# Patient Record
Sex: MALE | Race: WHITE | HISPANIC OR LATINO
[De-identification: names, ages, dates, MRNs, and addresses within clinical notes are randomized per-mention and may not be internally consistent; named-entity substitution may affect disease eponyms.]

---

## 2020-12-21 ENCOUNTER — APPOINTMENT (OUTPATIENT)
Dept: OPHTHALMOLOGY | Facility: CLINIC | Age: 50
End: 2020-12-21
Payer: COMMERCIAL

## 2020-12-21 ENCOUNTER — NON-APPOINTMENT (OUTPATIENT)
Age: 50
End: 2020-12-21

## 2020-12-21 PROCEDURE — 99201 OFFICE OUTPATIENT NEW 10 MINUTES: CPT | Mod: 95

## 2023-06-09 ENCOUNTER — APPOINTMENT (OUTPATIENT)
Dept: ORTHOPEDIC SURGERY | Facility: CLINIC | Age: 53
End: 2023-06-09
Payer: COMMERCIAL

## 2023-06-09 VITALS — BODY MASS INDEX: 26.31 KG/M2 | WEIGHT: 205 LBS | HEIGHT: 74 IN

## 2023-06-09 DIAGNOSIS — I10 ESSENTIAL (PRIMARY) HYPERTENSION: ICD-10-CM

## 2023-06-09 PROCEDURE — 99203 OFFICE O/P NEW LOW 30 MIN: CPT | Mod: 25

## 2023-06-09 PROCEDURE — 73110 X-RAY EXAM OF WRIST: CPT | Mod: 50

## 2023-06-09 NOTE — PHYSICAL EXAM
[1st] : 1st [CMC Joint] : CMC joint [Bilateral] : wrists bilaterally [Degenerative change] : Degenerative change [] : no erythema [FreeTextEntry3] : B/L generalized OA findings  [FreeTextEntry8] : stage 3 first CMC arthritis

## 2023-06-09 NOTE — DISCUSSION/SUMMARY
[de-identified] : Discussed the nature of the diagnosis and risk and benefits of different modalities of treatment.\par Xrays reviewed. \par Discussed conservative management vs CSI. \par Pt would like a CSI in b/l [facility] [place] [facility]. \par Done today and tolerated well.\par All questions answered.\par

## 2023-06-09 NOTE — PROCEDURE
[Medium Joint Injection] : medium joint injection [Bilateral] : bilaterally of the [CMC Joint] : CMC joint [Pain] : pain [Inflammation] : inflammation [Alcohol] : alcohol [Ethyl Chloride sprayed topically] : ethyl chloride sprayed topically [Sterile technique used] : sterile technique used [___ cc    1%] : Lidocaine ~Vcc of 1%  [___ cc    10mg] : Triamcinolone (Kenalog) ~Vcc of 10 mg  [] : Patient tolerated procedure well [Risks, benefits, alternatives discussed / Verbal consent obtained] : the risks benefits, and alternatives have been discussed, and verbal consent was obtained

## 2023-06-09 NOTE — HISTORY OF PRESENT ILLNESS
[9] : 9 [Intermittent] : intermittent [Full time] : Work status: full time [de-identified] : 53 year old male presenting with b//l hand pain for the past 2 years, radial sided at the base of the thumbs. No injury/trauma. He has received injections in both thumbs 1 year ago by an outside provider with minimal relief. \par  [] : no [FreeTextEntry1] : bilateral hands  [FreeTextEntry5] : 54yo male presenting with bilateral hand pain. Has been experiencing pain for almost one year. Has seen another ortho, given injection with relief. Has taken Advil with relief.  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

## 2023-07-21 ENCOUNTER — APPOINTMENT (OUTPATIENT)
Dept: ORTHOPEDIC SURGERY | Facility: CLINIC | Age: 53
End: 2023-07-21

## 2023-10-13 ENCOUNTER — APPOINTMENT (OUTPATIENT)
Dept: ORTHOPEDIC SURGERY | Facility: CLINIC | Age: 53
End: 2023-10-13
Payer: COMMERCIAL

## 2023-10-13 VITALS — BODY MASS INDEX: 26.31 KG/M2 | HEIGHT: 74 IN | WEIGHT: 205 LBS

## 2023-10-13 PROCEDURE — 99213 OFFICE O/P EST LOW 20 MIN: CPT | Mod: 25

## 2023-10-13 PROCEDURE — 20605 DRAIN/INJ JOINT/BURSA W/O US: CPT | Mod: 50

## 2024-03-01 ENCOUNTER — APPOINTMENT (OUTPATIENT)
Dept: ORTHOPEDIC SURGERY | Facility: CLINIC | Age: 54
End: 2024-03-01
Payer: COMMERCIAL

## 2024-03-01 ENCOUNTER — NON-APPOINTMENT (OUTPATIENT)
Age: 54
End: 2024-03-01

## 2024-03-01 DIAGNOSIS — M18.12 UNILATERAL PRIMARY OSTEOARTHRITIS OF FIRST CARPOMETACARPAL JOINT, LEFT HAND: ICD-10-CM

## 2024-03-01 DIAGNOSIS — M18.11 UNILATERAL PRIMARY OSTEOARTHRITIS OF FIRST CARPOMETACARPAL JOINT, RIGHT HAND: ICD-10-CM

## 2024-03-01 PROCEDURE — 20605 DRAIN/INJ JOINT/BURSA W/O US: CPT | Mod: 50

## 2024-03-01 PROCEDURE — 99213 OFFICE O/P EST LOW 20 MIN: CPT | Mod: 25

## 2024-03-01 NOTE — DISCUSSION/SUMMARY
[de-identified] : Discussed the nature of the diagnosis and risk and benefits of different modalities of treatment. Discussed conservative management vs another CSI.  He would like another CSI in both CMC joints. This is #3.  CSI done today and tolerated well.

## 2024-03-01 NOTE — HISTORY OF PRESENT ILLNESS
[Intermittent] : intermittent [Full time] : Work status: full time [de-identified] : 53 year old male followed for b/l CMC arthritis. He has had 2 injections in both thumbs. Most recent in October. With reoccurrence.   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

## 2024-03-01 NOTE — PHYSICAL EXAM
[Bilateral] : hand bilaterally [1st] : 1st [CMC Joint] : CMC joint [] : no ecchymosis [FreeTextEntry3] : B/L generalized OA findings

## 2024-03-01 NOTE — PROCEDURE
[Medium Joint Injection] : medium joint injection [CMC Joint] : CMC joint [Bilateral] : bilaterally of the [Pain] : pain [Inflammation] : inflammation [Alcohol] : alcohol [Sterile technique used] : sterile technique used [___ cc    1%] : Lidocaine ~Vcc of 1%  [Ethyl Chloride sprayed topically] : ethyl chloride sprayed topically [___ cc    10mg] : Triamcinolone (Kenalog) ~Vcc of 10 mg  [] : Patient tolerated procedure well [Risks, benefits, alternatives discussed / Verbal consent obtained] : the risks benefits, and alternatives have been discussed, and verbal consent was obtained

## 2025-07-08 ENCOUNTER — APPOINTMENT (OUTPATIENT)
Dept: ORTHOPEDIC SURGERY | Facility: CLINIC | Age: 55
End: 2025-07-08
Payer: COMMERCIAL

## 2025-07-08 VITALS — WEIGHT: 202 LBS | HEIGHT: 74 IN | BODY MASS INDEX: 25.93 KG/M2

## 2025-07-08 PROCEDURE — 20605 DRAIN/INJ JOINT/BURSA W/O US: CPT | Mod: 50

## 2025-07-08 PROCEDURE — 99213 OFFICE O/P EST LOW 20 MIN: CPT | Mod: 25

## 2025-07-08 PROCEDURE — 73140 X-RAY EXAM OF FINGER(S): CPT | Mod: RT
